# Patient Record
Sex: MALE | Race: WHITE | Employment: FULL TIME | URBAN - METROPOLITAN AREA
[De-identification: names, ages, dates, MRNs, and addresses within clinical notes are randomized per-mention and may not be internally consistent; named-entity substitution may affect disease eponyms.]

---

## 2018-09-04 PROBLEM — Z00.00 MEDICARE ANNUAL WELLNESS VISIT, SUBSEQUENT: Status: ACTIVE | Noted: 2018-09-04

## 2022-05-10 ENCOUNTER — APPOINTMENT (OUTPATIENT)
Dept: CT IMAGING | Age: 37
End: 2022-05-10
Attending: PHYSICIAN ASSISTANT
Payer: COMMERCIAL

## 2022-05-10 ENCOUNTER — HOSPITAL ENCOUNTER (EMERGENCY)
Age: 37
Discharge: HOME | End: 2022-05-10
Attending: EMERGENCY MEDICINE
Payer: COMMERCIAL

## 2022-05-10 VITALS
WEIGHT: 186 LBS | RESPIRATION RATE: 18 BRPM | TEMPERATURE: 98.1 F | OXYGEN SATURATION: 100 % | HEIGHT: 71 IN | SYSTOLIC BLOOD PRESSURE: 120 MMHG | HEART RATE: 65 BPM | BODY MASS INDEX: 26.04 KG/M2 | DIASTOLIC BLOOD PRESSURE: 80 MMHG

## 2022-05-10 DIAGNOSIS — F80.81 STUTTER: Primary | ICD-10-CM

## 2022-05-10 LAB
ALBUMIN SERPL-MCNC: 4.3 G/DL (ref 3.5–5)
ALBUMIN/GLOB SERPL: 1.2 {RATIO} (ref 1.2–3.5)
ALP SERPL-CCNC: 53 U/L (ref 50–136)
ALT SERPL-CCNC: 28 U/L (ref 12–65)
AMPHET UR QL SCN: NEGATIVE
ANION GAP SERPL CALC-SCNC: 7 MMOL/L (ref 7–16)
AST SERPL-CCNC: 17 U/L (ref 15–37)
BARBITURATES UR QL SCN: NEGATIVE
BASOPHILS # BLD: 0.1 K/UL (ref 0–0.2)
BASOPHILS NFR BLD: 1 % (ref 0–2)
BENZODIAZ UR QL: NEGATIVE
BILIRUB SERPL-MCNC: 0.8 MG/DL (ref 0.2–1.1)
BUN SERPL-MCNC: 11 MG/DL (ref 6–23)
CALCIUM SERPL-MCNC: 9.3 MG/DL (ref 8.3–10.4)
CANNABINOIDS UR QL SCN: NEGATIVE
CHLORIDE SERPL-SCNC: 103 MMOL/L (ref 98–107)
CO2 SERPL-SCNC: 30 MMOL/L (ref 21–32)
COCAINE UR QL SCN: NEGATIVE
CREAT SERPL-MCNC: 1.05 MG/DL (ref 0.8–1.5)
DIFFERENTIAL METHOD BLD: ABNORMAL
EOSINOPHIL # BLD: 0 K/UL (ref 0–0.8)
EOSINOPHIL NFR BLD: 0 % (ref 0.5–7.8)
ERYTHROCYTE [DISTWIDTH] IN BLOOD BY AUTOMATED COUNT: 12.9 % (ref 11.9–14.6)
GLOBULIN SER CALC-MCNC: 3.6 G/DL (ref 2.3–3.5)
GLUCOSE SERPL-MCNC: 97 MG/DL (ref 65–100)
HCT VFR BLD AUTO: 40.3 % (ref 41.1–50.3)
HGB BLD-MCNC: 13.6 G/DL (ref 13.6–17.2)
IMM GRANULOCYTES # BLD AUTO: 0 K/UL (ref 0–0.5)
IMM GRANULOCYTES NFR BLD AUTO: 0 % (ref 0–5)
LYMPHOCYTES # BLD: 1.4 K/UL (ref 0.5–4.6)
LYMPHOCYTES NFR BLD: 19 % (ref 13–44)
MCH RBC QN AUTO: 29 PG (ref 26.1–32.9)
MCHC RBC AUTO-ENTMCNC: 33.7 G/DL (ref 31.4–35)
MCV RBC AUTO: 85.9 FL (ref 79.6–97.8)
METHADONE UR QL: NEGATIVE
MONOCYTES # BLD: 0.5 K/UL (ref 0.1–1.3)
MONOCYTES NFR BLD: 7 % (ref 4–12)
NEUTS SEG # BLD: 5.3 K/UL (ref 1.7–8.2)
NEUTS SEG NFR BLD: 73 % (ref 43–78)
NRBC # BLD: 0 K/UL (ref 0–0.2)
OPIATES UR QL: NEGATIVE
PCP UR QL: NEGATIVE
PLATELET # BLD AUTO: 205 K/UL (ref 150–450)
PMV BLD AUTO: 9.4 FL (ref 9.4–12.3)
POTASSIUM SERPL-SCNC: 4.2 MMOL/L (ref 3.5–5.1)
PROT SERPL-MCNC: 7.9 G/DL (ref 6.3–8.2)
RBC # BLD AUTO: 4.69 M/UL (ref 4.23–5.6)
SODIUM SERPL-SCNC: 140 MMOL/L (ref 136–145)
WBC # BLD AUTO: 7.3 K/UL (ref 4.3–11.1)

## 2022-05-10 PROCEDURE — 70450 CT HEAD/BRAIN W/O DYE: CPT

## 2022-05-10 PROCEDURE — 80053 COMPREHEN METABOLIC PANEL: CPT

## 2022-05-10 PROCEDURE — 99284 EMERGENCY DEPT VISIT MOD MDM: CPT

## 2022-05-10 PROCEDURE — 80307 DRUG TEST PRSMV CHEM ANLYZR: CPT

## 2022-05-10 PROCEDURE — 85025 COMPLETE CBC W/AUTO DIFF WBC: CPT

## 2022-05-10 NOTE — ED TRIAGE NOTES
Patient reports stuttering that started on Saturday. Patient denies any headache denies any injury.  Patient a&ox4 able to answer all questions    Patient takes prozac and ritalin

## 2022-05-10 NOTE — ED PROVIDER NOTES
Patient presents to the emergency department stating that 3 nights ago he noticed that his speech had a stutter. He states over the last 2 days it has been worse and feels like his thinking occasionally is somewhat foggy. He denies a headache today. No slurred speech, facial droop, paresthesias or unilateral motor weakness. He has a history of depression and takes Prozac. He states this is managed by his primary care physician. Past Medical History:   Diagnosis Date    ADD (attention deficit disorder) 6/1/2015       Past Surgical History:   Procedure Laterality Date    HX APPENDECTOMY           Family History:   Problem Relation Age of Onset    Cancer Father        Social History     Socioeconomic History    Marital status:      Spouse name: Not on file    Number of children: Not on file    Years of education: Not on file    Highest education level: Not on file   Occupational History    Not on file   Tobacco Use    Smoking status: Never Smoker    Smokeless tobacco: Never Used   Substance and Sexual Activity    Alcohol use: Yes     Alcohol/week: 0.0 standard drinks     Comment: Socially    Drug use: Not on file    Sexual activity: Not on file   Other Topics Concern    Not on file   Social History Narrative    Not on file     Social Determinants of Health     Financial Resource Strain:     Difficulty of Paying Living Expenses: Not on file   Food Insecurity:     Worried About Running Out of Food in the Last Year: Not on file    Dimitris of Food in the Last Year: Not on file   Transportation Needs:     Lack of Transportation (Medical): Not on file    Lack of Transportation (Non-Medical):  Not on file   Physical Activity:     Days of Exercise per Week: Not on file    Minutes of Exercise per Session: Not on file   Stress:     Feeling of Stress : Not on file   Social Connections:     Frequency of Communication with Friends and Family: Not on file    Frequency of Social Gatherings with Friends and Family: Not on file    Attends [employer] Services: Not on file    Active Member of Clubs or Organizations: Not on file    Attends Club or Organization Meetings: Not on file    Marital Status: Not on file   Intimate Partner Violence:     Fear of Current or Ex-Partner: Not on file    Emotionally Abused: Not on file    Physically Abused: Not on file    Sexually Abused: Not on file   Housing Stability:     Unable to Pay for Housing in the Last Year: Not on file    Number of [place] in the Last Year: Not on file    Unstable Housing in the Last Year: Not on file         ALLERGIES: Patient has no known allergies. Review of Systems   Neurological: Positive for speech difficulty. Negative for dizziness, light-headedness, numbness and headaches. All other systems reviewed and are negative. Vitals:    05/10/22 1243   BP: 124/86   Pulse: 70   Resp: 16   Temp: 98.1 °F (36.7 °C)   SpO2: 99%   Weight: 84.4 kg (186 lb)   Height: 5' 11\" (1.803 m)            Physical Exam  Vitals and nursing note reviewed. Constitutional:       Appearance: Normal appearance. HENT:      Head: Normocephalic and atraumatic. Nose: Nose normal.      Mouth/Throat:      Mouth: Mucous membranes are moist.   Eyes:      Extraocular Movements: Extraocular movements intact. Pupils: Pupils are equal, round, and reactive to light. Cardiovascular:      Rate and Rhythm: Normal rate and regular rhythm. Pulses: Normal pulses. Heart sounds: Normal heart sounds. Pulmonary:      Effort: Pulmonary effort is normal.      Breath sounds: Normal breath sounds. Musculoskeletal:         General: Normal range of motion. Cervical back: Normal range of motion and neck supple. Skin:     General: Skin is warm and dry. Neurological:      General: No focal deficit present. Mental Status: He is alert and oriented to person, place, and time. Mental status is at baseline.       Cranial Nerves: No cranial nerve deficit. Motor: No weakness. Gait: Gait normal.      Comments: I initially witnessed patient speaking to his significant other and did not appreciate any obvious daughter but then when patient saw me and I am began to examine him the stutter became more pronounced and wonder if there may be a anxiety component. Psychiatric:         Mood and Affect: Mood normal.         Behavior: Behavior normal.         Thought Content: Thought content normal.          MDM  Number of Diagnoses or Management Options  Diagnosis management comments: Differential diagnoses: Stutter, anxiety, CVA, intracranial tumor, drug reaction    Patient's labs and head CT are unremarkable. I discussed case with ER attending Dr. Miriam Tena who is in agreement that patient can follow-up with primary care physician.        Amount and/or Complexity of Data Reviewed  Clinical lab tests: reviewed  Tests in the radiology section of CPT®: reviewed    Risk of Complications, Morbidity, and/or Mortality  Presenting problems: moderate  Diagnostic procedures: moderate  Management options: low    Patient Progress  Patient progress: improved         Procedures

## 2022-05-10 NOTE — ED NOTES
I have reviewed discharge instructions with the patient. The patient verbalized understanding. Patient left ED via Discharge Method: ambulatory to Home with family    Opportunity for questions and clarification provided. No acute distress present      Patient given 0 scripts. To continue your aftercare when you leave the hospital, you may receive an automated call from our care team to check in on how you are doing. This is a free service and part of our promise to provide the best care and service to meet your aftercare needs.  If you have questions, or wish to unsubscribe from this service please call [phone]. Thank you for Choosing our [address] [place] [address] Emergency Department.

## 2022-05-10 NOTE — Clinical Note
[id number] [address] EMERGENCY DEPT  [address] [facility] [address] [phone]    Work/School Note    Date: 5/10/2022    To Whom It May concern:    Romayne Handy was seen and treated today in the emergency room by the following provider(s):  Attending Provider: Julianna Fisher MD  Physician Assistant: MOISES Leonard. Romayne Handy is excused from work/school on 05/10/22 and 05/11/22. He is medically clear to return to work/school on 5/12/2022.        Sincerely,          MOISES Hernandez

## 2022-05-10 NOTE — DISCHARGE INSTRUCTIONS
Your blood work and head CT are unremarkable. The stutter could be due to anxiety.   I would recommend initially following up with your primary care physician this week and then if symptoms persist they could refer you to neurology if this continues

## 2022-05-25 ENCOUNTER — TELEPHONE (OUTPATIENT)
Dept: FAMILY MEDICINE CLINIC | Facility: CLINIC | Age: 37
End: 2022-05-25

## 2022-05-25 NOTE — TELEPHONE ENCOUNTER
[facility] is currently working referrals dated March 20. Dr. Brent Rodriguez would need to speak with one of their physicians if there is an urgent need and referral needs to be expedited.

## 2022-05-25 NOTE — TELEPHONE ENCOUNTER
Pt wife calling in regarding pt, states that they heard from Dr Rhett Christianson office and that she told them that she did not see a need to see patient and would not because he was \"just stuttering\". Pt's stutter has progressed to point that is it interfering with communication skills and they are fearing his job security. Is there anywhere we can get him in sooner or contact neurology with anant grajeda for sooner?

## 2022-05-25 NOTE — TELEPHONE ENCOUNTER
Patient is having trouble getting in with neurology based on ER visit being deemed non-urgent. Would you like to contact and place as urgent?

## 2022-05-26 ENCOUNTER — TELEPHONE (OUTPATIENT)
Dept: FAMILY MEDICINE CLINIC | Facility: CLINIC | Age: 37
End: 2022-05-26

## 2022-05-26 NOTE — TELEPHONE ENCOUNTER
Sergio Mauro, this is perfect. This is exactly what I thought.   Hopefully he can get in to Dr. José Miguel Millan

## 2022-05-26 NOTE — TELEPHONE ENCOUNTER
There is no way he will get an earlier if at all at [facility]. Marvin Newberry are there some out of network neurologists we can try? Do you recommend I resend to [facility] neurology as a urgent. ?  The only urgency would be for his job security as I do not think this is an emergent medical condition and probably has psychological overlay

## 2022-05-26 NOTE — TELEPHONE ENCOUNTER
Pt wife called [address] [place] [address] to schedule appt with neurologist, was told that they are not accepting the referral due to him not being  A patient of the [address] [place] [address] system. What do we do now?

## 2022-05-26 NOTE — TELEPHONE ENCOUNTER
[facility] will not accept outside referrals. The neurologist he requested (Dr Babin Cancer) declined the referral after reviewing. I will send to Dr. Beltran [facility]). In order for [facility] to schedule urgently, you would have to talk with one of their physicians. No additional orders are needed - I can use what has already been ordered.

## 2022-05-28 NOTE — TELEPHONE ENCOUNTER
Truthfully, either Dr. Christopher Higgins will have to talk with a physician to expedite the referral with [facility] or he would need to see a PCP through [facility] or [facility] to get a referral into their system that they would accept. They could also call [facility] to get on a cancellation list, but it will still probably be weeks to get in.

## 2022-05-31 NOTE — TELEPHONE ENCOUNTER
[address] refused referral due to pt not referred from within their group. Would you like to expedite referral or suggest they see a pcp through [facility] or [facility] as stated below?

## 2022-05-31 NOTE — TELEPHONE ENCOUNTER
Sena Azar, for clarification. He does have an appt with [facility] correct? We need to keep that date, even if far out because I know from experience he is not going to get in anywhere else in neurology. It's slighty better than trying to get a psych appt. Do you know if he is seeing therapist or has been referred to? That is likely what he will need and I would get started on that right away. Let me know if he hasnt been referred to or is seeing one thanks.   md Heidy

## 2022-06-01 DIAGNOSIS — R47.01 EXPRESSIVE APHASIA: Primary | ICD-10-CM

## 2022-06-01 NOTE — TELEPHONE ENCOUNTER
Ferol Spatz, that was my fault. I could have sworn I either made a referral to neurology [facility] or that was performed through the ER but that is slip up on my hand. Darn it. I will set up a referral to [facility] neurology. In the meantime I will set up a therapist evaluation at [place] [address]. only if that seems to fail should we refer to psychiatry.  md Heidy

## 2022-06-01 NOTE — TELEPHONE ENCOUNTER
He has not been scheduled with  Neurology. He is waiting to be scheduled. They are currently working on referrals from the end of March. No referrals have been placed for therapist or psychiatry.